# Patient Record
Sex: MALE | Race: OTHER | Employment: FULL TIME | URBAN - METROPOLITAN AREA
[De-identification: names, ages, dates, MRNs, and addresses within clinical notes are randomized per-mention and may not be internally consistent; named-entity substitution may affect disease eponyms.]

---

## 2017-04-05 ENCOUNTER — OFFICE VISIT (OUTPATIENT)
Dept: FAMILY MEDICINE CLINIC | Facility: CLINIC | Age: 42
End: 2017-04-05

## 2017-04-05 VITALS
HEART RATE: 66 BPM | RESPIRATION RATE: 20 BRPM | SYSTOLIC BLOOD PRESSURE: 124 MMHG | WEIGHT: 282 LBS | BODY MASS INDEX: 39.48 KG/M2 | HEIGHT: 71 IN | OXYGEN SATURATION: 96 % | DIASTOLIC BLOOD PRESSURE: 80 MMHG

## 2017-04-05 DIAGNOSIS — E66.09 NON MORBID OBESITY DUE TO EXCESS CALORIES: ICD-10-CM

## 2017-04-05 DIAGNOSIS — E78.00 HYPERCHOLESTEREMIA: ICD-10-CM

## 2017-04-05 DIAGNOSIS — Z00.01 ENCOUNTER FOR ROUTINE ADULT HEALTH EXAMINATION WITH ABNORMAL FINDINGS: Primary | ICD-10-CM

## 2017-04-05 PROCEDURE — 99396 PREV VISIT EST AGE 40-64: CPT

## 2017-04-05 NOTE — PROGRESS NOTES
CC: Annual Physical Exam     HPI:   Radha Paez is a 35year old male who presents for a complete physical exam. Pt denies any complains at this time.     Wt Readings from Last 6 Encounters:  04/05/17 : 282 lb  08/17/16 : 273 lb    Body mass index is mg/dL   TOTAL PROTEIN (P) 7.4 5.9 - 8.4 g/dL   ALBUMIN (P) 4.1 3.5 - 4.8 g/dL   GLOBULIN 3.3 2.5 - 3.7 g/dL   A/G RATIO 1.2 1.0 - 2.0   GFR/NON- >60 >60   GFR/ >60 >60   -LIPID PANEL   Result Value Ref Range   HDL CHOLESTERO bladder control. HEMATOLOGIC:  Denies anemia, bleeding or bruising. LYMPHATICS:  Denies enlarged nodes or history of splenectomy. PSYCHIATRIC:  Denies depression or anxiety.   ENDOCRINOLOGIC:  Denies excessive sweating, cold or heat intolerance, polyuria Placed This Encounter  CBC, Platelet, No Differential [E]  Comp Metabolic Panel (14) [E]  Lipid Panel [E]  Urinalysis, Routine [E][If pt <2 yrs old, must also order Reducing Substance ([id number])]    1.  Encounter for routine adult health examination with abn

## 2017-04-05 NOTE — PATIENT INSTRUCTIONS
Prevention Guidelines, Men Ages 36 to 52  Screening tests and vaccines are an important part of managing your health. Health counseling is essential, too. Below are guidelines for these, for men ages 36 to 52.  Talk with your healthcare provider to make s Hepatitis A Men at increased risk for infection – talk with your healthcare provider 2 doses given at least 6 months apart   Hepatitis B Men at increased risk for infection – talk with your healthcare provider 3 doses over 6 months; second dose should be g © 6476-4023 [address], [address] [place]. All rights reserved. This information is not intended as a substitute for professional medical care. Always follow your healthcare professional's instructions.

## 2017-08-31 ENCOUNTER — TELEPHONE (OUTPATIENT)
Dept: FAMILY MEDICINE CLINIC | Facility: CLINIC | Age: 42
End: 2017-08-31

## 2017-08-31 NOTE — TELEPHONE ENCOUNTER
Dr Sam Booker is unable to Rx Viagra for patient, last time he got it was from Dr Elvis Luu back in 2015. Patient was notified and will schedule an appointment to get a new Rx.

## 2017-11-05 ENCOUNTER — HOSPITAL ENCOUNTER (EMERGENCY)
Facility: HOSPITAL | Age: 42
Discharge: HOME | End: 2017-11-05
Payer: COMMERCIAL

## 2017-11-05 VITALS
HEART RATE: 61 BPM | SYSTOLIC BLOOD PRESSURE: 125 MMHG | BODY MASS INDEX: 40 KG/M2 | TEMPERATURE: 98 F | WEIGHT: 285 LBS | OXYGEN SATURATION: 100 % | RESPIRATION RATE: 18 BRPM | DIASTOLIC BLOOD PRESSURE: 71 MMHG

## 2017-11-05 DIAGNOSIS — S61.012A THUMB LACERATION, LEFT, INITIAL ENCOUNTER: Primary | ICD-10-CM

## 2017-11-05 PROCEDURE — 90471 IMMUNIZATION ADMIN: CPT

## 2017-11-05 PROCEDURE — 12001 RPR S/N/AX/GEN/TRNK 2.5CM/<: CPT

## 2017-11-05 PROCEDURE — 99283 EMERGENCY DEPT VISIT LOW MDM: CPT

## 2017-11-05 NOTE — ED PROVIDER NOTES
Patient Seen in: [facility] AND [facility] Emergency Department    History   Patient presents with:  Laceration Abrasion (integumentary)    Stated Complaint: laceration from knife to left thumb    HPI    42-year-old male presents to the emergency department with Eyes: Pupils are equal, round, and reactive to light. Musculoskeletal: Normal range of motion. He exhibits tenderness (thenar eminence tender to touch). He exhibits no edema or deformity.    Neurological: He is alert and oriented to person, place, and t

## 2017-11-05 NOTE — ED NOTES
Gauze dressing to site, discharge instructions given and reviewed, told to follow up with pmd 1-2 days for wound check. Return with any new or worsening symptoms. Pt verbalized knowledge home with family.

## 2017-11-11 ENCOUNTER — HOSPITAL ENCOUNTER (EMERGENCY)
Facility: HOSPITAL | Age: 42
Discharge: HOME | End: 2017-11-11
Attending: EMERGENCY MEDICINE
Payer: COMMERCIAL

## 2017-11-11 ENCOUNTER — APPOINTMENT (OUTPATIENT)
Dept: GENERAL RADIOLOGY | Facility: HOSPITAL | Age: 42
End: 2017-11-11
Attending: EMERGENCY MEDICINE
Payer: COMMERCIAL

## 2017-11-11 VITALS
HEART RATE: 96 BPM | SYSTOLIC BLOOD PRESSURE: 140 MMHG | DIASTOLIC BLOOD PRESSURE: 82 MMHG | RESPIRATION RATE: 19 BRPM | TEMPERATURE: 98 F | OXYGEN SATURATION: 97 %

## 2017-11-11 DIAGNOSIS — I10 UNCONTROLLED HYPERTENSION: Primary | ICD-10-CM

## 2017-11-11 DIAGNOSIS — R51.9 ACUTE NONINTRACTABLE HEADACHE, UNSPECIFIED HEADACHE TYPE: ICD-10-CM

## 2017-11-11 PROCEDURE — 80048 BASIC METABOLIC PNL TOTAL CA: CPT | Performed by: EMERGENCY MEDICINE

## 2017-11-11 PROCEDURE — 96361 HYDRATE IV INFUSION ADD-ON: CPT

## 2017-11-11 PROCEDURE — 96375 TX/PRO/DX INJ NEW DRUG ADDON: CPT

## 2017-11-11 PROCEDURE — 93005 ELECTROCARDIOGRAM TRACING: CPT

## 2017-11-11 PROCEDURE — 71020 XR CHEST PA + LAT CHEST (CPT=71020): CPT | Performed by: EMERGENCY MEDICINE

## 2017-11-11 PROCEDURE — 85025 COMPLETE CBC W/AUTO DIFF WBC: CPT | Performed by: EMERGENCY MEDICINE

## 2017-11-11 PROCEDURE — 84484 ASSAY OF TROPONIN QUANT: CPT | Performed by: EMERGENCY MEDICINE

## 2017-11-11 PROCEDURE — 93010 ELECTROCARDIOGRAM REPORT: CPT | Performed by: EMERGENCY MEDICINE

## 2017-11-11 PROCEDURE — 99285 EMERGENCY DEPT VISIT HI MDM: CPT

## 2017-11-11 PROCEDURE — 96374 THER/PROPH/DIAG INJ IV PUSH: CPT

## 2017-11-11 RX ORDER — KETOROLAC TROMETHAMINE 30 MG/ML
INJECTION, SOLUTION INTRAMUSCULAR; INTRAVENOUS
Status: COMPLETED
Start: 2017-11-11 | End: 2017-11-11

## 2017-11-11 RX ORDER — KETOROLAC TROMETHAMINE 30 MG/ML
30 INJECTION, SOLUTION INTRAMUSCULAR; INTRAVENOUS ONCE
Status: COMPLETED | OUTPATIENT
Start: 2017-11-11 | End: 2017-11-11

## 2017-11-11 RX ORDER — SODIUM CHLORIDE 9 MG/ML
INJECTION, SOLUTION INTRAVENOUS ONCE
Status: COMPLETED | OUTPATIENT
Start: 2017-11-11 | End: 2017-11-11

## 2017-11-11 RX ORDER — AMLODIPINE BESYLATE 5 MG/1
5 TABLET ORAL DAILY
Qty: 30 TABLET | Refills: 0 | Status: SHIPPED | OUTPATIENT
Start: 2017-11-11 | End: 2017-11-16

## 2017-11-11 RX ORDER — METOCLOPRAMIDE HYDROCHLORIDE 5 MG/ML
10 INJECTION INTRAMUSCULAR; INTRAVENOUS ONCE
Status: COMPLETED | OUTPATIENT
Start: 2017-11-11 | End: 2017-11-11

## 2017-11-12 NOTE — ED PROVIDER NOTES
Patient Seen in: [facility] AND [facility] Emergency Department    History   Patient presents with:  Arrythmia/Palpitations (cardiovascular)    Stated Complaint:     HPI    40-year-old male patient presents complaining of a headache since last night associated w distress  HEENT: Anicteric, EOMI, PERRL, clear oropharynx  Neck:. Supple. No meningismus.   Heart: Regular rate and rhythm, no murmur  Lungs: Normal respiratory effort, clear lungs  Abdomen: Soft,  nondistended, non tender  : No CVA tenderness  Skin: No type    Disposition:  Discharge  11/11/2017  7:23 pm    Follow-up:  Jim Agrawal MD  [address]  [phone]    Schedule an appointment as soon as possible for a visit in 2 days  For reevaluation of your symptoms.

## 2017-11-16 ENCOUNTER — OFFICE VISIT (OUTPATIENT)
Dept: FAMILY MEDICINE CLINIC | Facility: CLINIC | Age: 42
End: 2017-11-16

## 2017-11-16 VITALS — SYSTOLIC BLOOD PRESSURE: 144 MMHG | BODY MASS INDEX: 39 KG/M2 | WEIGHT: 281 LBS | DIASTOLIC BLOOD PRESSURE: 96 MMHG

## 2017-11-16 DIAGNOSIS — E66.09 NON MORBID OBESITY DUE TO EXCESS CALORIES: Primary | ICD-10-CM

## 2017-11-16 DIAGNOSIS — I10 HYPERTENSION, UNSPECIFIED TYPE: ICD-10-CM

## 2017-11-16 DIAGNOSIS — Z00.00 WELL ADULT EXAM: ICD-10-CM

## 2017-11-16 PROCEDURE — 99213 OFFICE O/P EST LOW 20 MIN: CPT | Performed by: FAMILY MEDICINE

## 2017-11-16 PROCEDURE — 99396 PREV VISIT EST AGE 40-64: CPT | Performed by: FAMILY MEDICINE

## 2017-11-16 RX ORDER — AMLODIPINE BESYLATE AND BENAZEPRIL HYDROCHLORIDE 5; 20 MG/1; MG/1
1 CAPSULE ORAL DAILY
Qty: 90 CAPSULE | Refills: 3 | Status: SHIPPED | OUTPATIENT
Start: 2017-11-16 | End: 2019-11-21

## 2017-11-16 NOTE — PROGRESS NOTES
[facility] Office Note  Chief Complaint:   Patient presents with:  HTN      HPI:   This is a 38year old male coming in for f\u htn      Results for orders placed or performed during the hospital encounter of 11/11/17 Erectile dysfunction    • Hypertension    • Obesity      Past Surgical History:  No date: CIRCUMCISION,OTHR,  10/31/15: LAPAROSCOPIC APPENDECTOMY  Social History:  Smoking status: Former Smoker 39.19 kg/m² as calculated from the following:    Height as of 4/5/17: 71\". Weight as of this encounter: 281 lb. Vital signs reviewed. Appears stated age, well groomed.   Physical Exam:  GEN:  Patient is alert, awake and oriented, well developed, well n the treatments as a result of today.      Problem List:  Patient Active Problem List:     Acute medial meniscal tear of the right knee     Encounter for routine adult health examination with abnormal findings     Hypercholesteremia     Non morbid obesity du

## 2017-12-04 ENCOUNTER — NURSE ONLY (OUTPATIENT)
Dept: FAMILY MEDICINE CLINIC | Facility: CLINIC | Age: 42
End: 2017-12-04

## 2017-12-04 DIAGNOSIS — E78.00 HYPERCHOLESTEREMIA: ICD-10-CM

## 2017-12-04 DIAGNOSIS — Z00.01 ENCOUNTER FOR ROUTINE ADULT HEALTH EXAMINATION WITH ABNORMAL FINDINGS: Primary | ICD-10-CM

## 2017-12-04 PROCEDURE — 36415 COLL VENOUS BLD VENIPUNCTURE: CPT | Performed by: FAMILY MEDICINE

## 2017-12-04 NOTE — PROCEDURES
Patient presented to clinic for routine blood work. Orders verified in patient chart. Name and  verified. Patient is fasting. Blood drawn from the left antecubital, tolerated well without complications.   Sent to [employer].

## 2017-12-11 ENCOUNTER — MED REC SCAN ONLY (OUTPATIENT)
Dept: FAMILY MEDICINE CLINIC | Facility: CLINIC | Age: 42
End: 2017-12-11

## 2017-12-11 ENCOUNTER — OFFICE VISIT (OUTPATIENT)
Dept: FAMILY MEDICINE CLINIC | Facility: CLINIC | Age: 42
End: 2017-12-11

## 2017-12-11 VITALS — SYSTOLIC BLOOD PRESSURE: 140 MMHG | DIASTOLIC BLOOD PRESSURE: 82 MMHG | WEIGHT: 281 LBS | BODY MASS INDEX: 39 KG/M2

## 2017-12-11 DIAGNOSIS — I10 HYPERTENSION, UNSPECIFIED TYPE: Primary | ICD-10-CM

## 2017-12-11 DIAGNOSIS — E78.2 MIXED HYPERLIPIDEMIA: ICD-10-CM

## 2017-12-11 PROCEDURE — 99213 OFFICE O/P EST LOW 20 MIN: CPT | Performed by: FAMILY MEDICINE

## 2017-12-11 NOTE — PATIENT INSTRUCTIONS
FOODS TO AVOID    Meats/Fish:   Avoid marbled beef, duck and goose (remove the skin from poultry); processed meats; luncheon meats (salami, bologna); frankfurters and fast food hamburgers (they're loaded with fat); organ meats; (kidney, liver); and canned serving must be substituted for one bread or cereal serving. Limit alcohol to 2 servings per day. Special Notes:  1. Use all foods in moderation. 2.  Read labels carefully-sometimes they can be misleading.   3.  While on a triglyceride lowering di and goldie toast may also be used as bread substitutes, as may pasta, rice or corn. Beans:   Dried peas or beans may be used as bread substitute.     Nuts:  Nuts are high in fat and calories, although most of the fat is unsaturated and there is no cholest

## 2017-12-11 NOTE — PROGRESS NOTES
[facility] Office Note  Chief Complaint:   Patient presents with:  Lab Results  HTN      HPI:   This is a 38year old male coming in for f\u.       Results for orders placed or performed in visit on 12/07/17  -MARIBEL LAI lesions or ulcerations, good dentition. NECK: Supple, no CLAD, no JVD, no thyromegaly. SKIN: No rashes, no skin lesion, no bruising, good turgor. HEART:  Regular rate and rhythm, no murmurs, rubs or gallops.   LUNGS: Clear to auscultation bilterally, no

## 2023-06-28 ENCOUNTER — OFFICE VISIT (OUTPATIENT)
Dept: INTERNAL MEDICINE CLINIC | Facility: CLINIC | Age: 48
End: 2023-06-28
Payer: COMMERCIAL

## 2023-06-28 VITALS
HEIGHT: 70 IN | TEMPERATURE: 99 F | DIASTOLIC BLOOD PRESSURE: 70 MMHG | HEART RATE: 65 BPM | WEIGHT: 281 LBS | SYSTOLIC BLOOD PRESSURE: 118 MMHG | BODY MASS INDEX: 40.23 KG/M2 | OXYGEN SATURATION: 98 %

## 2023-06-28 DIAGNOSIS — L91.0 KELOID: ICD-10-CM

## 2023-06-28 DIAGNOSIS — Z76.89 ESTABLISHING CARE WITH NEW DOCTOR, ENCOUNTER FOR: Primary | ICD-10-CM

## 2023-06-28 DIAGNOSIS — Z12.11 ENCOUNTER FOR SCREENING COLONOSCOPY: ICD-10-CM

## 2023-06-28 DIAGNOSIS — R73.03 PREDIABETES: ICD-10-CM

## 2023-06-28 DIAGNOSIS — E78.5 HYPERLIPIDEMIA, UNSPECIFIED HYPERLIPIDEMIA TYPE: ICD-10-CM

## 2023-06-28 PROCEDURE — 99203 OFFICE O/P NEW LOW 30 MIN: CPT | Performed by: INTERNAL MEDICINE

## 2023-06-28 PROCEDURE — 3074F SYST BP LT 130 MM HG: CPT | Performed by: INTERNAL MEDICINE

## 2023-06-28 PROCEDURE — 3008F BODY MASS INDEX DOCD: CPT | Performed by: INTERNAL MEDICINE

## 2023-06-28 PROCEDURE — 3078F DIAST BP <80 MM HG: CPT | Performed by: INTERNAL MEDICINE

## 2023-08-01 ENCOUNTER — NURSE ONLY (OUTPATIENT)
Facility: CLINIC | Age: 48
End: 2023-08-01

## 2023-08-01 DIAGNOSIS — Z12.11 COLON CANCER SCREENING: Primary | ICD-10-CM

## 2023-08-01 DIAGNOSIS — Z80.0 FH: GI CANCER: ICD-10-CM

## 2023-08-01 NOTE — PROGRESS NOTES
Dx: Family hx of colon cancer  Colonoscopy with MAC sedation  Split dose suprep, if suprep not covered then golytely or moviprep, sent to pharmacy  Please review prep instructions with patient    - If the patient is taking oral diabetic medications then they should HOLD diabetic medications the night before and/or the day of the procedure - If the patient is on insulin, please have the PCP or endocrinologist assist with management of dosing prior to the procedure.  - NO herbal supplements or weight loss medications x 7 days prior to the procedure. - If patient is taking Xarelto OR Eliquis then it needs to be helf for 48 hours prior to the procedure --> Should get approval from the prescribing physician (PCP or cardiologist) to hold this medication prior to the procedure. - If the patient is taking Coumadin then it needs to be on hold Coumadin for 5 days prior to the procedure --> Should get approval from the prescribing physician (PCP or cardiologist) to hold this medication prior to the procedure. *If the bowel prep prescribed is too expensive/not covered by the patient's insurance please pend an alternative and I will sign that order. Thank you.

## 2023-08-01 NOTE — PROGRESS NOTES
Scheduled for: Colonoscopy [id number]  Provider Name: Dr Pete Murillo   Date: Efren Carty 11/16/2023  Location: [facility]    Sedation: MAC   Time: 8:15 am   Prep: split golytely  Meds/Allergies Reconciled?: reviewed by provider   Diagnosis with codes: colon screening Z12.11, family Hx colon cancer Z80.0  Was patient informed to call insurance with codes (Y/N): Yes   Referral sent?: Yes  [facility] or [facility] notified?: Electronic case request was sent to [facility] via [employer]. Medication Orders: Pt is aware to NOT take iron pills, herbal meds and diet supplements for 7 days before exam. Also to NOT take any form of alcohol, recreational drugs and any forms of ED meds 24 hours before exam.      Misc Orders:       Further instructions given by staff:  Instructions given in office and pt verbalized understanding    [facility] instructions sent

## 2023-11-16 PROBLEM — Z12.11 COLON CANCER SCREENING: Status: ACTIVE | Noted: 2017-04-05

## 2023-11-21 ENCOUNTER — TELEPHONE (OUTPATIENT)
Facility: CLINIC | Age: 48
End: 2023-11-21

## 2023-11-21 NOTE — TELEPHONE ENCOUNTER
Recall colonoscopy in 1 year per Dr Gail Saavedra.     Colon done 11/16/2023    Health maintenance updated and message sent to patient outreach to repeat colonoscopy in 1 year

## 2023-11-21 NOTE — TELEPHONE ENCOUNTER
----- Message from Rene Mckeon MD sent at 11/20/2023  4:05 PM CST -----  Large, tubulovillous adenoma removed. This plus 7 other adenomas. Given size of TVA I would like him to repeat colonoscopy in 1 year. I discussed this with the patient who expressed understanding. GI Staff:    Can you please place recall for this patient to have a colonoscopy in 1 year. Thank you.     Rene Mckeon MD

## 2023-12-04 ENCOUNTER — OFFICE VISIT (OUTPATIENT)
Dept: INTERNAL MEDICINE CLINIC | Facility: CLINIC | Age: 48
End: 2023-12-04
Payer: COMMERCIAL

## 2023-12-04 ENCOUNTER — LAB ENCOUNTER (OUTPATIENT)
Dept: LAB | Facility: HOSPITAL | Age: 48
End: 2023-12-04
Attending: INTERNAL MEDICINE
Payer: COMMERCIAL

## 2023-12-04 ENCOUNTER — TELEPHONE (OUTPATIENT)
Dept: INTERNAL MEDICINE CLINIC | Facility: CLINIC | Age: 48
End: 2023-12-04

## 2023-12-04 VITALS
HEART RATE: 71 BPM | OXYGEN SATURATION: 96 % | TEMPERATURE: 98 F | DIASTOLIC BLOOD PRESSURE: 72 MMHG | WEIGHT: 283 LBS | SYSTOLIC BLOOD PRESSURE: 124 MMHG | BODY MASS INDEX: 39 KG/M2

## 2023-12-04 DIAGNOSIS — Z12.5 ENCOUNTER FOR SCREENING FOR MALIGNANT NEOPLASM OF PROSTATE: ICD-10-CM

## 2023-12-04 DIAGNOSIS — Z00.00 ANNUAL PHYSICAL EXAM: Primary | ICD-10-CM

## 2023-12-04 DIAGNOSIS — R63.4 WEIGHT LOSS: ICD-10-CM

## 2023-12-04 DIAGNOSIS — R73.09 ELEVATED GLUCOSE: ICD-10-CM

## 2023-12-04 LAB
ALBUMIN SERPL-MCNC: 4.6 G/DL (ref 3.2–4.8)
ALBUMIN/GLOB SERPL: 1.5 {RATIO} (ref 1–2)
ALP LIVER SERPL-CCNC: 88 U/L
ALT SERPL-CCNC: 21 U/L
ANION GAP SERPL CALC-SCNC: 7 MMOL/L (ref 0–18)
AST SERPL-CCNC: 17 U/L (ref ?–34)
BASOPHILS # BLD AUTO: 0.05 X10(3) UL (ref 0–0.2)
BASOPHILS NFR BLD AUTO: 0.8 %
BILIRUB SERPL-MCNC: 1 MG/DL (ref 0.3–1.2)
BUN BLD-MCNC: 12 MG/DL (ref 9–23)
BUN/CREAT SERPL: 11.3 (ref 10–20)
CALCIUM BLD-MCNC: 9.5 MG/DL (ref 8.7–10.4)
CHLORIDE SERPL-SCNC: 107 MMOL/L (ref 98–112)
CHOLEST SERPL-MCNC: 200 MG/DL (ref ?–200)
CO2 SERPL-SCNC: 26 MMOL/L (ref 21–32)
COMPLEXED PSA SERPL-MCNC: 1.55 NG/ML (ref ?–4)
CREAT BLD-MCNC: 1.06 MG/DL
DEPRECATED RDW RBC AUTO: 41.1 FL (ref 35.1–46.3)
EGFRCR SERPLBLD CKD-EPI 2021: 85 ML/MIN/1.73M2 (ref 60–?)
EOSINOPHIL # BLD AUTO: 0.16 X10(3) UL (ref 0–0.7)
EOSINOPHIL NFR BLD AUTO: 2.7 %
ERYTHROCYTE [DISTWIDTH] IN BLOOD BY AUTOMATED COUNT: 13.3 % (ref 11–15)
EST. AVERAGE GLUCOSE BLD GHB EST-MCNC: 108 MG/DL (ref 68–126)
FASTING PATIENT LIPID ANSWER: YES
FASTING STATUS PATIENT QL REPORTED: YES
GLOBULIN PLAS-MCNC: 3.1 G/DL (ref 2.8–4.4)
GLUCOSE BLD-MCNC: 100 MG/DL (ref 70–99)
HBA1C MFR BLD: 5.4 % (ref ?–5.7)
HCT VFR BLD AUTO: 44 %
HDLC SERPL-MCNC: 37 MG/DL (ref 40–59)
HGB BLD-MCNC: 14.4 G/DL
IMM GRANULOCYTES # BLD AUTO: 0.05 X10(3) UL (ref 0–1)
IMM GRANULOCYTES NFR BLD: 0.8 %
LDLC SERPL CALC-MCNC: 137 MG/DL (ref ?–100)
LYMPHOCYTES # BLD AUTO: 1.11 X10(3) UL (ref 1–4)
LYMPHOCYTES NFR BLD AUTO: 18.7 %
MCH RBC QN AUTO: 28 PG (ref 26–34)
MCHC RBC AUTO-ENTMCNC: 32.7 G/DL (ref 31–37)
MCV RBC AUTO: 85.6 FL
MONOCYTES # BLD AUTO: 0.51 X10(3) UL (ref 0.1–1)
MONOCYTES NFR BLD AUTO: 8.6 %
NEUTROPHILS # BLD AUTO: 4.07 X10 (3) UL (ref 1.5–7.7)
NEUTROPHILS # BLD AUTO: 4.07 X10(3) UL (ref 1.5–7.7)
NEUTROPHILS NFR BLD AUTO: 68.4 %
NONHDLC SERPL-MCNC: 163 MG/DL (ref ?–130)
OSMOLALITY SERPL CALC.SUM OF ELEC: 290 MOSM/KG (ref 275–295)
PLATELET # BLD AUTO: 249 10(3)UL (ref 150–450)
POTASSIUM SERPL-SCNC: 4.1 MMOL/L (ref 3.5–5.1)
PROT SERPL-MCNC: 7.7 G/DL (ref 5.7–8.2)
RBC # BLD AUTO: 5.14 X10(6)UL
SODIUM SERPL-SCNC: 140 MMOL/L (ref 136–145)
T4 FREE SERPL-MCNC: 1.5 NG/DL (ref 0.8–1.7)
TRIGL SERPL-MCNC: 146 MG/DL (ref 30–149)
TSI SER-ACNC: 1.36 MIU/ML (ref 0.55–4.78)
VLDLC SERPL CALC-MCNC: 27 MG/DL (ref 0–30)
WBC # BLD AUTO: 6 X10(3) UL (ref 4–11)

## 2023-12-04 PROCEDURE — 3078F DIAST BP <80 MM HG: CPT | Performed by: INTERNAL MEDICINE

## 2023-12-04 PROCEDURE — 84439 ASSAY OF FREE THYROXINE: CPT | Performed by: INTERNAL MEDICINE

## 2023-12-04 PROCEDURE — 3074F SYST BP LT 130 MM HG: CPT | Performed by: INTERNAL MEDICINE

## 2023-12-04 PROCEDURE — 80050 GENERAL HEALTH PANEL: CPT | Performed by: INTERNAL MEDICINE

## 2023-12-04 PROCEDURE — 84153 ASSAY OF PSA TOTAL: CPT | Performed by: INTERNAL MEDICINE

## 2023-12-04 PROCEDURE — 99396 PREV VISIT EST AGE 40-64: CPT | Performed by: INTERNAL MEDICINE

## 2023-12-04 PROCEDURE — 80061 LIPID PANEL: CPT | Performed by: INTERNAL MEDICINE

## 2023-12-04 PROCEDURE — 83036 HEMOGLOBIN GLYCOSYLATED A1C: CPT | Performed by: INTERNAL MEDICINE

## 2023-12-04 NOTE — TELEPHONE ENCOUNTER
Pt called stating that he just went to get bw done and nurse was unable to get a vein so they jaqueline blood out of his left hand and now he has a huge bubble there, pt wants to know if this is normal?  Please advise

## 2023-12-05 NOTE — TELEPHONE ENCOUNTER
I spoke with pt, said blood draw was from hand yesterday. Started to bubble while phlebotomist was drawing it. Told it would bruise. Said a couple hours after, he removed bandage and the area bubbled. Spouse knows a nurse who advised to massage the area. Pt reports bubble has resolved. Hand is a bit sore, denies pain. No further questions at this time.

## 2023-12-13 ENCOUNTER — MED REC SCAN ONLY (OUTPATIENT)
Facility: CLINIC | Age: 48
End: 2023-12-13

## 2024-09-25 ENCOUNTER — TELEPHONE (OUTPATIENT)
Facility: CLINIC | Age: 49
End: 2024-09-25

## 2024-09-25 DIAGNOSIS — D12.6 TUBULOVILLOUS ADENOMA OF COLON: Primary | ICD-10-CM

## 2024-09-25 NOTE — TELEPHONE ENCOUNTER
Left message for the patient to call back     Last Procedure, Date, MD:  2023, colonoscopy, Dr Ricketts  Last Diagnosis:  sessile serrated adenoma  Recalled (mth/yrs): 1 year  Sedation Used Previously:  MAC  Last Prep Used (if known):  Golytely  Quality Of Prep (if known): good  Anticoagulants:  Diuretics:   Diabetic Med's (PO/Injectables):   Weight loss Med's:  Iron/Herbal/Multivitamin Supplements (RX/OTC):  Marijuana/Vaping/CBD: no  Height & Weight: 5'11\"/283  BMI:39.47  Hx of Cardiac/CVA Issues/(MI/Stroke):  Devices Pacemaker/Defibrillator/Stents:  Respiratory Issues/Oxygen Use/HALEY/COPD:  Issues w/ Anesthesia:    Symptoms (Y/N):   Symptoms Details:     Special Comments/Notes: LOV note with Dr Sosa 2023    Please advise on orders and prep.     Thank you!        Jeevan Ricketts MD   Physician  Gastroenterology     Operative Report     Signed     Date of Service: 2023  8:16 AM  Case Time: Procedures: Surgeons:   2023  8:16 AM COLONOSCOPY    Jeevan Ricketts MD               Signed         COLONOSCOPY REPORT           Drew Carlin      10/23/1975 Age 48 year old   PCP Shakeel Sosa MD Endoscopist Jeevan Ricketts MD         Date of procedure: 23     [facility]     Procedure: Colonoscopy w/ submucosal injection, hot snare polypectomy with clip placement, cold snare polypectomy, cold forceps polypectomy     Pre-operative diagnosis: screening     Post-operative diagnosis: polyps     Medications: MAC     Withdrawal time: 31 minutes     Procedure:  Informed consent was obtained from the patient after the risks of the procedure were discussed, including but not limited to bleeding, perforation, aspiration, infection, or possibility of a missed lesion. After discussions of the risks/benefits and alternatives to this procedure, as well as the planned sedation, the patient was placed in the left lateral decubitus position  and begun on continuous blood pressure pulse oximetry and EKG monitoring and this was maintained throughout the procedure. Once an adequate level of sedation was obtained a digital rectal exam was completed. Then the lubricated tip of the [id number] diagnostic video colonoscope was inserted and advanced without difficulty to the cecum using water immersion and CO2 insufflation technique. The cecum was identified by localizing the trifold, the appendix and the ileocecal valve. Withdrawal was begun with thorough washing and careful examination of the colonic walls and folds. A routine second examination of the cecum/ascending colon was performed. Photodocumentation was obtained. The bowel prep was good. Views of the colon were good with washing. I then carefully withdrew the instrument from the patient who tolerated the procedure well.      Complications: none.     Findings:   1. 8 polyps noted as follows:      A. THREE -- 5 mm polyps in the ascending colon; sessile morphology; cold snare polypectomy performed, polyps retrieved.      B. TWO -- 2 mm polyps in the ascending colon; sessile morphology; cold forceps polypectomy performed, polyps retrieved.      C. TWO -- 2 mm polyps in the transverse colon; sessile morphology; cold forceps polypectomy performed, polyps retrieved.      D. 20 mm polyp in the descending colon; semi-pedunculated morphology; 5 ccs of \"blue boost\" was injected into the base of the stalk prior to hot snare polypectomy with success. This raised the polyp up and made it easier to get the snare around the entire polyp as it was multilobulated and covering a portion of the stalk. Hot snare polypectomy was performed, the polyp fragmented after resection. The polyp fragments were retrieved. There was no oozing after resection. Three hemoclips were placed to prevent future bleeding.     2. Diverticulosis: none visualized..     3. Ileocecal valve: the visualized mucosa appeared normal.     4. The  colonic mucosa throughout the colon showed normal vascular pattern, without evidence of angioectasias or inflammation.      5. A retroflexed view of the rectum revealed small internal hemorrhoids.     6. KEN: normal rectal tone, no masses palpated.      Impression:   8 polyps resected ranging in size from 2 mm to 20 mm. The 20 mm polyp was removed via hot snare polypectomy with 3 hemoclips placed after resection.  The colon was otherwise normal with glistening mucosa and intact vascular pattern throughout.     Recommend:  Await pathology. The interval for the next colonoscopy will be determined after reviewing pathology. If new signs or symptoms develop, colonoscopy may need to be repeated sooner.   High fiber diet.  Monitor for blood in the stool. If having more than just tinge of blood, call office or go to the ER.  Repeat colonoscopy in 1 to 3 years pending pathology.     >>>If tissue was obtained and you have not received your pathology results either by phone or letter within 2 weeks, please call our office at [phone].     Specimens: polyps     Blood loss: <1 ml               Electronically signed by Jeevan Ricketts MD at 11/16/2023  9:01 AM  ==========================  A DIAGNOSIS    Comment: Fragments of tubular adenoma.     B DIAGNOSIS    Comment: Fragments of sessile serrated lesion/polyp  (SSL/P); negative for cytologic dysplasia.     C DIAGNOSIS    Comment: Fragments of tubulovillous adenoma.

## 2024-09-26 NOTE — TELEPHONE ENCOUNTER
Called and spoke to patient. Dr. Ricketts, patient is wondering if his prep could be changed to the pills instead of the Go-lytley.     Last Procedure, Date, MD:  2023, colonoscopy, Dr Ricketts  Last Diagnosis:  sessile serrated adenoma  Recalled (mth/yrs): 1 year  Sedation Used Previously:  MAC  Last Prep Used (if known):  Golytely  Quality Of Prep (if known): good  Anticoagulants: No  Diuretics: No  Diabetic Med's (PO/Injectables): No  Weight loss Med's: No  Iron/Herbal/Multivitamin Supplements (RX/OTC): No  Marijuana/Vaping/CBD: No  Height & Weight: 5'11\"/279.4  BMI:38.9  Hx of Cardiac/CVA Issues/(MI/Stroke): No  Devices Pacemaker/Defibrillator/Stents: No  Respiratory Issues/Oxygen Use/HALEY/COPD: No  Issues w/ Anesthesia: No     Symptoms (Y/N): Patient experiences abdominal bloating after eating certain fast foods or processed foods. Patient tries to stay away  Symptoms Details: Periodic abdominal bloating after eating. Patient is working on changing his food intake and avoiding foods that cause his symptoms.     Special Comments/Notes: LOV note with Dr Sosa 2023     Please advise on orders and prep.      Thank you!          Jeevan Ricketts MD   Physician  Gastroenterology     Operative Report     Signed     Date of Service: 2023  8:16 AM        Case Time: Procedures: Surgeons:    2023  8:16 AM COLONOSCOPY    Jeevan Ricketts MD                     Signed          COLONOSCOPY REPORT                           Drew Tesfaye       10/23/1975 Age 48 year old      PCP Shakeel Sosa MD Endoscopist Jeevan Ricketts MD            Date of procedure: 23     [facility]     Procedure: Colonoscopy w/ submucosal injection, hot snare polypectomy with clip placement, cold snare polypectomy, cold forceps polypectomy     Pre-operative diagnosis: screening     Post-operative diagnosis: polyps     Medications: MAC     Withdrawal time:  31 minutes     Procedure:  Informed consent was obtained from the patient after the risks of the procedure were discussed, including but not limited to bleeding, perforation, aspiration, infection, or possibility of a missed lesion. After discussions of the risks/benefits and alternatives to this procedure, as well as the planned sedation, the patient was placed in the left lateral decubitus position and begun on continuous blood pressure pulse oximetry and EKG monitoring and this was maintained throughout the procedure. Once an adequate level of sedation was obtained a digital rectal exam was completed. Then the lubricated tip of the [id number] diagnostic video colonoscope was inserted and advanced without difficulty to the cecum using water immersion and CO2 insufflation technique. The cecum was identified by localizing the trifold, the appendix and the ileocecal valve. Withdrawal was begun with thorough washing and careful examination of the colonic walls and folds. A routine second examination of the cecum/ascending colon was performed. Photodocumentation was obtained. The bowel prep was good. Views of the colon were good with washing. I then carefully withdrew the instrument from the patient who tolerated the procedure well.      Complications: none.     Findings:   1. 8 polyps noted as follows:      A. THREE -- 5 mm polyps in the ascending colon; sessile morphology; cold snare polypectomy performed, polyps retrieved.      B. TWO -- 2 mm polyps in the ascending colon; sessile morphology; cold forceps polypectomy performed, polyps retrieved.      C. TWO -- 2 mm polyps in the transverse colon; sessile morphology; cold forceps polypectomy performed, polyps retrieved.      D. 20 mm polyp in the descending colon; semi-pedunculated morphology; 5 ccs of \"blue boost\" was injected into the base of the stalk prior to hot snare polypectomy with success. This raised the polyp up and made it easier to get the snare  around the entire polyp as it was multilobulated and covering a portion of the stalk. Hot snare polypectomy was performed, the polyp fragmented after resection. The polyp fragments were retrieved. There was no oozing after resection. Three hemoclips were placed to prevent future bleeding.     2. Diverticulosis: none visualized..     3. Ileocecal valve: the visualized mucosa appeared normal.     4. The colonic mucosa throughout the colon showed normal vascular pattern, without evidence of angioectasias or inflammation.      5. A retroflexed view of the rectum revealed small internal hemorrhoids.     6. KEN: normal rectal tone, no masses palpated.      Impression:   8 polyps resected ranging in size from 2 mm to 20 mm. The 20 mm polyp was removed via hot snare polypectomy with 3 hemoclips placed after resection.  The colon was otherwise normal with glistening mucosa and intact vascular pattern throughout.     Recommend:  Await pathology. The interval for the next colonoscopy will be determined after reviewing pathology. If new signs or symptoms develop, colonoscopy may need to be repeated sooner.   High fiber diet.  Monitor for blood in the stool. If having more than just tinge of blood, call office or go to the ER.  Repeat colonoscopy in 1 to 3 years pending pathology.     >>>If tissue was obtained and you have not received your pathology results either by phone or letter within 2 weeks, please call our office at [phone].     Specimens: polyps     Blood loss: <1 ml               Electronically signed by Jeevan Ricketts MD at 11/16/2023  9:01 AM  ==========================  A DIAGNOSIS     Comment: Fragments of tubular adenoma.      B DIAGNOSIS     Comment: Fragments of sessile serrated lesion/polyp  (SSL/P); negative for cytologic dysplasia.      C DIAGNOSIS     Comment: Fragments of tubulovillous adenoma.

## 2024-09-27 NOTE — TELEPHONE ENCOUNTER
Dx: hx of large TVA (sigmoid colon) 11/2023.  Colonoscopy with MAC sedation  Split dose SUTAB  Please review prep instructions with patient    - If the patient is taking oral diabetic medications then they should HOLD diabetic medications the night before and/or the day of the procedure   - If the patient is on insulin, please have the PCP or endocrinologist assist with management of dosing prior to the procedure.  - NO herbal supplements or weight loss medications x 7 days prior to the procedure.  - If patient is taking Xarelto OR Eliquis then it needs to be helf for 48 hours prior to the procedure --> Should get approval from the prescribing physician (PCP or cardiologist) to hold this medication prior to the procedure.  - If the patient is taking Coumadin then it needs to be on hold Coumadin for 5 days prior to the procedure --> Should get approval from the prescribing physician (PCP or cardiologist) to hold this medication prior to the procedure.  - If the patient takes Plavix/Brilinta then it needs to be held for 5 days prior to the procedure --> Should get approval from the prescribing physician (PCP or cardiologist) to hold this medication prior to the procedure.    *If the bowel prep prescribed is too expensive/not covered by the patient's insurance please pend an alternative and I will sign that order.    Thank you.

## 2024-09-30 ENCOUNTER — TELEPHONE (OUTPATIENT)
Facility: CLINIC | Age: 49
End: 2024-09-30

## 2024-09-30 RX ORDER — SOD SULF/POT CHLORIDE/MAG SULF 1.479 G
24 TABLET ORAL AS DIRECTED
Qty: 24 TABLET | Refills: 0 | Status: SHIPPED | OUTPATIENT
Start: 2024-09-30 | End: 2024-10-01

## 2024-09-30 NOTE — TELEPHONE ENCOUNTER
I spoke to the pt and told him to go to [employer] for the Savings card for the Sutab    Pt can complete the form on line and receive a savings card

## 2024-09-30 NOTE — TELEPHONE ENCOUNTER
Current Outpatient Medications   Medication Sig Dispense Refill    Sodium Sulfate-Mag Sulfate-KCl (SUTAB) 6584-865-612 MG Oral Tab Take 24 tablets by mouth As Directed for 1 day. Take 12 tablets the night before procedure with 16 oz of water, take 12 tablets the morning of your procedure with 16 oz of water 24 tablet 0     Not covered

## 2024-09-30 NOTE — TELEPHONE ENCOUNTER
Scheduled for:  Colonoscopy 83112  Provider Name:  Dr. Ricketts   Date:  12/19/2024  Location:    [facility]  Sedation:  Mac  Time:  2:15 (pt is aware that CF will call the day before to confirm arrival time)  Prep:  sutab  Meds/Allergies Reconciled?:  Physician reviewed   Diagnosis with codes:  hx of large TVA D12.6  Was patient informed to call insurance with codes (Y/N):  Yes, I confirmed [employer]  insurance with the patient.   Referral sent?:  Referral was sent at the time of electronic surgical scheduling.  EM or [facility] notified?:  I sent an electronic request to Endo Scheduling and received a confirmation today.   Medication Orders:  This patient verbally confirmed that he is not taking:   Iron, blood thinners, BP meds, and is not diabetic   Not latex allergy, Not PCN allergy and does not have a pacemaker  Misc Orders:     I discussed the prep instructions with the patient which he verbally understood and is aware that I will mychart the instructions today.    Further instructions given by staff:

## 2024-12-04 ENCOUNTER — OFFICE VISIT (OUTPATIENT)
Dept: INTERNAL MEDICINE CLINIC | Facility: CLINIC | Age: 49
End: 2024-12-04
Payer: COMMERCIAL

## 2024-12-04 ENCOUNTER — LAB ENCOUNTER (OUTPATIENT)
Dept: LAB | Facility: HOSPITAL | Age: 49
End: 2024-12-04
Attending: INTERNAL MEDICINE
Payer: COMMERCIAL

## 2024-12-04 VITALS
DIASTOLIC BLOOD PRESSURE: 80 MMHG | WEIGHT: 282 LBS | BODY MASS INDEX: 39.48 KG/M2 | HEART RATE: 98 BPM | HEIGHT: 71 IN | SYSTOLIC BLOOD PRESSURE: 118 MMHG | OXYGEN SATURATION: 98 %

## 2024-12-04 DIAGNOSIS — Z12.5 ENCOUNTER FOR SCREENING FOR MALIGNANT NEOPLASM OF PROSTATE: ICD-10-CM

## 2024-12-04 DIAGNOSIS — Z91.89 CARDIOVASCULAR RISK FACTOR: ICD-10-CM

## 2024-12-04 DIAGNOSIS — L91.0 KELOID: ICD-10-CM

## 2024-12-04 DIAGNOSIS — R73.09 ELEVATED GLUCOSE: ICD-10-CM

## 2024-12-04 DIAGNOSIS — Z00.00 ANNUAL PHYSICAL EXAM: Primary | ICD-10-CM

## 2024-12-04 DIAGNOSIS — Z28.21 INFLUENZA VACCINATION DECLINED: ICD-10-CM

## 2024-12-04 LAB
ALBUMIN SERPL-MCNC: 4.9 G/DL (ref 3.2–4.8)
ALBUMIN/GLOB SERPL: 1.7 {RATIO} (ref 1–2)
ALP LIVER SERPL-CCNC: 90 U/L
ALT SERPL-CCNC: 25 U/L
ANION GAP SERPL CALC-SCNC: 10 MMOL/L (ref 0–18)
AST SERPL-CCNC: 25 U/L (ref ?–34)
BASOPHILS # BLD AUTO: 0.06 X10(3) UL (ref 0–0.2)
BASOPHILS NFR BLD AUTO: 0.8 %
BILIRUB SERPL-MCNC: 1.2 MG/DL (ref 0.3–1.2)
BUN BLD-MCNC: 14 MG/DL (ref 9–23)
BUN/CREAT SERPL: 12.6 (ref 10–20)
CALCIUM BLD-MCNC: 9.7 MG/DL (ref 8.7–10.4)
CHLORIDE SERPL-SCNC: 106 MMOL/L (ref 98–112)
CHOLEST SERPL-MCNC: 202 MG/DL (ref ?–200)
CO2 SERPL-SCNC: 26 MMOL/L (ref 21–32)
COMPLEXED PSA SERPL-MCNC: 0.42 NG/ML (ref ?–4)
CREAT BLD-MCNC: 1.11 MG/DL
DEPRECATED RDW RBC AUTO: 41.5 FL (ref 35.1–46.3)
EGFRCR SERPLBLD CKD-EPI 2021: 80 ML/MIN/1.73M2 (ref 60–?)
EOSINOPHIL # BLD AUTO: 0.18 X10(3) UL (ref 0–0.7)
EOSINOPHIL NFR BLD AUTO: 2.5 %
ERYTHROCYTE [DISTWIDTH] IN BLOOD BY AUTOMATED COUNT: 13.4 % (ref 11–15)
EST. AVERAGE GLUCOSE BLD GHB EST-MCNC: 117 MG/DL (ref 68–126)
FASTING PATIENT LIPID ANSWER: YES
FASTING STATUS PATIENT QL REPORTED: YES
GLOBULIN PLAS-MCNC: 2.9 G/DL (ref 2–3.5)
GLUCOSE BLD-MCNC: 99 MG/DL (ref 70–99)
HBA1C MFR BLD: 5.7 % (ref ?–5.7)
HCT VFR BLD AUTO: 41.9 %
HDLC SERPL-MCNC: 36 MG/DL (ref 40–59)
HGB BLD-MCNC: 14 G/DL
IMM GRANULOCYTES # BLD AUTO: 0.05 X10(3) UL (ref 0–1)
IMM GRANULOCYTES NFR BLD: 0.7 %
LDLC SERPL CALC-MCNC: 138 MG/DL (ref ?–100)
LYMPHOCYTES # BLD AUTO: 1.03 X10(3) UL (ref 1–4)
LYMPHOCYTES NFR BLD AUTO: 14.4 %
MCH RBC QN AUTO: 28.4 PG (ref 26–34)
MCHC RBC AUTO-ENTMCNC: 33.4 G/DL (ref 31–37)
MCV RBC AUTO: 85 FL
MONOCYTES # BLD AUTO: 0.66 X10(3) UL (ref 0.1–1)
MONOCYTES NFR BLD AUTO: 9.2 %
NEUTROPHILS # BLD AUTO: 5.19 X10 (3) UL (ref 1.5–7.7)
NEUTROPHILS # BLD AUTO: 5.19 X10(3) UL (ref 1.5–7.7)
NEUTROPHILS NFR BLD AUTO: 72.4 %
NONHDLC SERPL-MCNC: 166 MG/DL (ref ?–130)
OSMOLALITY SERPL CALC.SUM OF ELEC: 295 MOSM/KG (ref 275–295)
PLATELET # BLD AUTO: 258 10(3)UL (ref 150–450)
POTASSIUM SERPL-SCNC: 3.9 MMOL/L (ref 3.5–5.1)
PROT SERPL-MCNC: 7.8 G/DL (ref 5.7–8.2)
RBC # BLD AUTO: 4.93 X10(6)UL
SODIUM SERPL-SCNC: 142 MMOL/L (ref 136–145)
TRIGL SERPL-MCNC: 154 MG/DL (ref 30–149)
VLDLC SERPL CALC-MCNC: 28 MG/DL (ref 0–30)
WBC # BLD AUTO: 7.2 X10(3) UL (ref 4–11)

## 2024-12-04 PROCEDURE — 83036 HEMOGLOBIN GLYCOSYLATED A1C: CPT | Performed by: INTERNAL MEDICINE

## 2024-12-04 PROCEDURE — 85025 COMPLETE CBC W/AUTO DIFF WBC: CPT | Performed by: INTERNAL MEDICINE

## 2024-12-04 PROCEDURE — G0103 PSA SCREENING: HCPCS | Performed by: INTERNAL MEDICINE

## 2024-12-04 PROCEDURE — 80053 COMPREHEN METABOLIC PANEL: CPT | Performed by: INTERNAL MEDICINE

## 2024-12-04 PROCEDURE — 80061 LIPID PANEL: CPT | Performed by: INTERNAL MEDICINE

## 2024-12-04 NOTE — PROGRESS NOTES
[facility] part of [facility]  Return Patient Progress Note      HPI:     Chief Complaint   Patient presents with    Physical     Annual physical - pt is fasting        Drew Carlin is a 49 year old male presenting for:    Has a significant  has a past medical history of Erectile dysfunction, Hypertension, and Obesity.    Here for annual.    Has C scope scheduled 12/19/2024.        Labs:   CMP:  Lab Results   Component Value Date/Time     12/04/2023 10:09 AM    K 4.1 12/04/2023 10:09 AM     12/04/2023 10:09 AM    CO2 26.0 12/04/2023 10:09 AM    CREATSERUM 1.06 12/04/2023 10:09 AM    CA 9.5 12/04/2023 10:09 AM     (H) 12/04/2023 10:09 AM    TP 7.7 12/04/2023 10:09 AM    ALB 4.6 12/04/2023 10:09 AM    ALKPHO 88 12/04/2023 10:09 AM    AST 17 12/04/2023 10:09 AM    ALT 21 12/04/2023 10:09 AM    BILT 1.0 12/04/2023 10:09 AM    TSH 1.365 12/04/2023 10:09 AM    T4F 1.5 12/04/2023 10:09 AM        CBC:  Lab Results   Component Value Date    WBC 6.0 12/04/2023    HGB 14.4 12/04/2023    HCT 44.0 12/04/2023    .0 12/04/2023    NEPERCENT 68.4 12/04/2023    LYPERCENT 18.7 12/04/2023    MOPERCENT 8.6 12/04/2023    EOPERCENT 2.7 12/04/2023    BAPERCENT 0.8 12/04/2023    NE 4.07 12/04/2023    LYMABS 1.11 12/04/2023    MOABSO 0.51 12/04/2023    EOABSO 0.16 12/04/2023    BAABSO 0.05 12/04/2023          Hemoglobin A1C, Microalbumin  Lab Results   Component Value Date/Time    A1C 5.4 12/04/2023 10:09 AM        Lipid panel  Lab Results   Component Value Date/Time    CHOLEST 200 (H) 12/04/2023 10:09 AM    HDL 37 (L) 12/04/2023 10:09 AM    TRIG 146 12/04/2023 10:09 AM     (H) 12/04/2023 10:09 AM    NONHDLC 163 (H) 12/04/2023 10:09 AM        Medications:  No current outpatient medications on file.      PMH:  Past Medical History:    Erectile dysfunction    Hypertension    Obesity               REVIEW OF SYSTEMS:   Review of Systems   Constitutional:  Negative for chills, fatigue, fever  and unexpected weight change.   HENT:  Negative for congestion, ear pain, hearing loss, rhinorrhea, sinus pain and sore throat.    Eyes:  Negative for pain, redness and visual disturbance.   Respiratory:  Negative for apnea, cough, chest tightness, shortness of breath and wheezing.    Cardiovascular:  Negative for chest pain, palpitations and leg swelling.   Gastrointestinal:  Negative for abdominal distention, abdominal pain, blood in stool, constipation and nausea.   Endocrine: Negative for cold intolerance, heat intolerance and polyuria.   Genitourinary:  Negative for dysuria, hematuria and urgency.   Musculoskeletal:  Negative for arthralgias, back pain, gait problem, joint swelling, myalgias and neck pain.   Skin:  Negative for rash and wound.   Allergic/Immunologic: Negative for food allergies and immunocompromised state.   Neurological:  Negative for dizziness, seizures, facial asymmetry, speech difficulty, weakness, light-headedness, numbness and headaches.   Hematological:  Negative for adenopathy. Does not bruise/bleed easily.   Psychiatric/Behavioral:  Negative for behavioral problems, sleep disturbance and suicidal ideas. The patient is not nervous/anxious.             PHYSICAL EXAM:   /80   Pulse 98   Ht 5' 11\" (1.803 m)   Wt 282 lb (127.9 kg)   SpO2 98%   BMI 39.33 kg/m²  Estimated body mass index is 39.33 kg/m² as calculated from the following:    Height as of this encounter: 5' 11\" (1.803 m).    Weight as of this encounter: 282 lb (127.9 kg).     Wt Readings from Last 3 Encounters:   12/04/24 282 lb (127.9 kg)   12/04/23 283 lb (128.4 kg)   11/11/23 274 lb (124.3 kg)       Physical Exam  Vitals reviewed.   Constitutional:       General: He is not in acute distress.     Appearance: He is well-developed.   HENT:      Head: Normocephalic and atraumatic.   Eyes:      Conjunctiva/sclera: Conjunctivae normal.      Pupils: Pupils are equal, round, and reactive to light.   Neck:      Thyroid: No  thyromegaly.   Cardiovascular:      Rate and Rhythm: Normal rate and regular rhythm.      Heart sounds: Normal heart sounds, S1 normal and S2 normal. No murmur heard.     No friction rub. No gallop.   Pulmonary:      Effort: Pulmonary effort is normal. No respiratory distress.      Breath sounds: Normal breath sounds. No wheezing or rales.   Chest:      Chest wall: No tenderness.   Abdominal:      General: Bowel sounds are normal. There is no distension.      Palpations: Abdomen is soft. There is no mass.      Tenderness: There is no abdominal tenderness. There is no guarding or rebound.   Musculoskeletal:         General: No tenderness. Normal range of motion.      Cervical back: Normal range of motion.   Lymphadenopathy:      Cervical: No cervical adenopathy.   Skin:     General: Skin is warm.      Findings: No erythema or rash.   Neurological:      Mental Status: He is alert and oriented to person, place, and time.      Cranial Nerves: No cranial nerve deficit.      Deep Tendon Reflexes: Reflexes are normal and symmetric.   Psychiatric:         Behavior: Behavior normal.         Thought Content: Thought content normal.         Judgment: Judgment normal.               ASSESSMENT AND PLAN:   Patient is a 49 year old male who presents primarily presents for:    1. Annual Physical Exam:     - No significant abnormalities noted in a 39-year-old male with a history of obesity and hyperlipidemia. Discussed age-appropriate screening and vaccines. Flu vaccine was declined.    2. Health Maintenance Screening:     - Colonoscopy has been scheduled for 12/19/2024.    3. Elevated Glucose and HbA1c:     - Advised to monitor glucose levels and encouraged lifestyle modifications, including diet and exercise. Follow-up in 6 months.    4. Keloid (Right Lateral Posterior):     - Patient reports growth in the area. Referral to general/plastic surgeon for evaluation and potential treatment. Follow-up in 6 months.    Health  Maintenance:    Health Maintenance   Topic Date Due    Zoster Vaccines (1 of 2) Never done    COVID-19 Vaccine (1 - 2024-25 season) Never done    Influenza Vaccine (1) Never done    Colorectal Cancer Screening  11/16/2024    Annual Physical  12/04/2024    PSA  12/04/2025    DTaP,Tdap,and Td Vaccines (2 - Td or Tdap) 11/05/2027    Annual Depression Screening  Completed    Pneumococcal Vaccine: Birth to 64yrs  Aged Out         Meds & Refills for this Visit:  Requested Prescriptions      No prescriptions requested or ordered in this encounter       No orders of the defined types were placed in this encounter.      Imaging & Consults:  None          No follow-ups on file.  Important follow up notes/labs for next visit      Patient indicates understanding of the above recommendations and agrees to the above plan.  Reasurrance and education provided. All questions answered.    Notified to call with any questions, complications, allergies, or worsening or changing symptoms as well as any side effects or complications from the treatments .  Red flags/ ER precautions discussed.    If diagnostic labs or imaging ordered advised patient to contact my office for results  24-48 hours after completion    This note was dictated using dragon speech recognition transcription software.  Typographical and transcription errors may be present.  Please call if any questions.       As part of our commitment to providing you with comprehensive, transparent, and timely access to your health information, we adhere to the guidelines set forth by the 21st Century Cures Act. This Act enhances your rights to access your electronic health information and ensures that you can easily obtain your medical records.  Please note that the verbage used in this note is intended for medical documentation and communication and may be interpreted as forthright.  Please do not hesitate to contact my office if you have any questions.            Shakeel Sosa  MD [facility]

## 2024-12-19 PROBLEM — D12.6 TUBULOVILLOUS ADENOMA OF COLON: Status: ACTIVE | Noted: 2024-12-19

## 2024-12-19 PROCEDURE — 88305 TISSUE EXAM BY PATHOLOGIST: CPT | Performed by: STUDENT IN AN ORGANIZED HEALTH CARE EDUCATION/TRAINING PROGRAM

## 2024-12-26 ENCOUNTER — TELEPHONE (OUTPATIENT)
Facility: CLINIC | Age: 49
End: 2024-12-26

## 2024-12-26 NOTE — TELEPHONE ENCOUNTER
Recall colonoscopy in 3 years per Dr Ricketts.    Colon done 12/19/2024    Health maintenance updated and message sent to patient outreach to repeat colonoscopy in 3 years    Results seen by patient via mychart  Seen by patient Drew Carlin on 12/24/2024  3:36 PM

## 2024-12-26 NOTE — TELEPHONE ENCOUNTER
----- Message from Jeevan Ricketts sent at 12/24/2024  3:33 PM CST -----  1 adenoma removed on recent colonoscopy. Has hx of large colon polyp.    Repeat in 3 years.    Mychart sent.    GI Staff:    Can you please place recall for this patient to have a colonoscopy in 3 years.    Thank you.    Jeevan Ricketts MD

## 2025-04-27 ENCOUNTER — HOSPITAL ENCOUNTER (OUTPATIENT)
Dept: CT IMAGING | Age: 50
Discharge: HOME | End: 2025-04-27
Attending: INTERNAL MEDICINE

## 2025-04-27 DIAGNOSIS — Z91.89 CARDIOVASCULAR RISK FACTOR: ICD-10-CM

## 2025-05-27 ENCOUNTER — PATIENT MESSAGE (OUTPATIENT)
Age: 50
End: 2025-05-27

## 2025-06-04 ENCOUNTER — HOSPITAL ENCOUNTER (OUTPATIENT)
Dept: CV DIAGNOSTICS | Facility: HOSPITAL | Age: 50
Discharge: HOME | End: 2025-06-04
Attending: INTERNAL MEDICINE
Payer: COMMERCIAL

## 2025-06-04 DIAGNOSIS — I28.8 ENLARGED PULMONARY ARTERY (HCC): ICD-10-CM

## 2025-06-04 PROCEDURE — 76376 3D RENDER W/INTRP POSTPROCES: CPT | Performed by: INTERNAL MEDICINE

## 2025-06-04 PROCEDURE — 93306 TTE W/DOPPLER COMPLETE: CPT | Performed by: INTERNAL MEDICINE

## 2025-06-10 ENCOUNTER — PATIENT MESSAGE (OUTPATIENT)
Age: 50
End: 2025-06-10

## 2025-06-13 ENCOUNTER — PATIENT MESSAGE (OUTPATIENT)
Age: 50
End: 2025-06-13

## 2025-06-16 ENCOUNTER — TELEPHONE (OUTPATIENT)
Age: 50
End: 2025-06-16

## 2025-06-16 NOTE — TELEPHONE ENCOUNTER
Message left asking patient to return our call to schedule him to see Dr. Sosa in 4 weeks from today. Okay to use SDA.     Please assist patient when he calls back.

## 2025-06-16 NOTE — TELEPHONE ENCOUNTER
----- Message from Shakeel Sosa sent at 6/13/2025 11:29 AM CDT -----  Should schedule follow up with me in 4-8 weeks.   ----- Message -----  From: Laura Riggs In  Sent: 6/4/2025   4:36 PM CDT  To: Shakeel Sosa MD

## 2025-07-10 ENCOUNTER — OFFICE VISIT (OUTPATIENT)
Age: 50
End: 2025-07-10
Payer: COMMERCIAL

## 2025-07-10 VITALS
SYSTOLIC BLOOD PRESSURE: 124 MMHG | OXYGEN SATURATION: 95 % | HEART RATE: 68 BPM | DIASTOLIC BLOOD PRESSURE: 80 MMHG | BODY MASS INDEX: 40.04 KG/M2 | TEMPERATURE: 97 F | WEIGHT: 286 LBS | HEIGHT: 71 IN

## 2025-07-10 DIAGNOSIS — Z71.3 WEIGHT LOSS COUNSELING, ENCOUNTER FOR: Primary | ICD-10-CM

## 2025-07-10 PROCEDURE — 99213 OFFICE O/P EST LOW 20 MIN: CPT | Performed by: INTERNAL MEDICINE

## 2025-07-10 NOTE — PROGRESS NOTES
[facility] part of [facility]  Return Patient Progress Note      HPI:     Chief Complaint   Patient presents with    Follow - Up     4 week follow up        Drew Carlin is a 49 year old male presenting for:    Has a significant  has a past medical history of Erectile dysfunction, Hypertension, and Obesity.    Here for follow up    Had C scope completed in dec 2024.      Here today with his wife.      He is focusing on healthy life style and weight loss.  He is working together with his wife to follow healthy diet.        Labs:   CMP:  Lab Results   Component Value Date/Time     12/04/2024 10:19 AM    K 3.9 12/04/2024 10:19 AM     12/04/2024 10:19 AM    CO2 26.0 12/04/2024 10:19 AM    CREATSERUM 1.11 12/04/2024 10:19 AM    CA 9.7 12/04/2024 10:19 AM    GLU 99 12/04/2024 10:19 AM    TP 7.8 12/04/2024 10:19 AM    ALB 4.9 (H) 12/04/2024 10:19 AM    ALKPHO 90 12/04/2024 10:19 AM    AST 25 12/04/2024 10:19 AM    ALT 25 12/04/2024 10:19 AM    BILT 1.2 12/04/2024 10:19 AM    TSH 1.365 12/04/2023 10:09 AM    T4F 1.5 12/04/2023 10:09 AM        CBC:  Lab Results   Component Value Date    WBC 7.2 12/04/2024    HGB 14.0 12/04/2024    HCT 41.9 12/04/2024    .0 12/04/2024    NEPERCENT 72.4 12/04/2024    LYPERCENT 14.4 12/04/2024    MOPERCENT 9.2 12/04/2024    EOPERCENT 2.5 12/04/2024    BAPERCENT 0.8 12/04/2024    NE 5.19 12/04/2024    LYMABS 1.03 12/04/2024    MOABSO 0.66 12/04/2024    EOABSO 0.18 12/04/2024    BAABSO 0.06 12/04/2024          Hemoglobin A1C, Microalbumin  Lab Results   Component Value Date/Time    A1C 5.7 (H) 12/04/2024 10:19 AM        Lipid panel  Lab Results   Component Value Date/Time    CHOLEST 202 (H) 12/04/2024 10:19 AM    HDL 36 (L) 12/04/2024 10:19 AM    TRIG 154 (H) 12/04/2024 10:19 AM     (H) 12/04/2024 10:19 AM    NONHDLC 166 (H) 12/04/2024 10:19 AM        Medications:  Current Outpatient Medications   Medication Sig Dispense Refill    atorvastatin 10 MG  Oral Tab Take 1 tablet (10 mg total) by mouth nightly. 90 tablet 0      PMH:  Past Medical History:    Erectile dysfunction    Hypertension    Obesity               REVIEW OF SYSTEMS:   Review of Systems   Constitutional:  Negative for chills, fatigue, fever and unexpected weight change.   HENT:  Negative for congestion, ear pain, hearing loss, rhinorrhea, sinus pain and sore throat.    Eyes:  Negative for pain, redness and visual disturbance.   Respiratory:  Negative for apnea, cough, chest tightness, shortness of breath and wheezing.    Cardiovascular:  Negative for chest pain, palpitations and leg swelling.   Gastrointestinal:  Negative for abdominal distention, abdominal pain, blood in stool, constipation and nausea.   Endocrine: Negative for cold intolerance, heat intolerance and polyuria.   Genitourinary:  Negative for dysuria, hematuria and urgency.   Musculoskeletal:  Negative for arthralgias, back pain, gait problem, joint swelling, myalgias and neck pain.   Skin:  Negative for rash and wound.   Allergic/Immunologic: Negative for food allergies and immunocompromised state.   Neurological:  Negative for dizziness, seizures, facial asymmetry, speech difficulty, weakness, light-headedness, numbness and headaches.   Hematological:  Negative for adenopathy. Does not bruise/bleed easily.   Psychiatric/Behavioral:  Negative for behavioral problems, sleep disturbance and suicidal ideas. The patient is not nervous/anxious.             PHYSICAL EXAM:   /80   Pulse 68   Temp 97.1 °F (36.2 °C)   Ht 5' 11\" (1.803 m)   Wt 286 lb (129.7 kg)   SpO2 95%   BMI 39.89 kg/m²  Estimated body mass index is 39.89 kg/m² as calculated from the following:    Height as of this encounter: 5' 11\" (1.803 m).    Weight as of this encounter: 286 lb (129.7 kg).     Wt Readings from Last 3 Encounters:   07/10/25 286 lb (129.7 kg)   12/16/24 280 lb (127 kg)   12/04/24 282 lb (127.9 kg)       Physical Exam  Vitals reviewed.    Constitutional:       General: He is not in acute distress.     Appearance: He is well-developed.   HENT:      Head: Normocephalic and atraumatic.   Eyes:      Conjunctiva/sclera: Conjunctivae normal.      Pupils: Pupils are equal, round, and reactive to light.   Neck:      Thyroid: No thyromegaly.   Cardiovascular:      Rate and Rhythm: Normal rate and regular rhythm.      Heart sounds: Normal heart sounds, S1 normal and S2 normal. No murmur heard.     No friction rub. No gallop.   Pulmonary:      Effort: Pulmonary effort is normal. No respiratory distress.      Breath sounds: Normal breath sounds. No wheezing or rales.   Chest:      Chest wall: No tenderness.   Abdominal:      General: Bowel sounds are normal. There is no distension.      Palpations: Abdomen is soft. There is no mass.      Tenderness: There is no abdominal tenderness. There is no guarding or rebound.   Musculoskeletal:         General: No tenderness. Normal range of motion.      Cervical back: Normal range of motion.   Lymphadenopathy:      Cervical: No cervical adenopathy.   Skin:     General: Skin is warm.      Findings: No erythema or rash.   Neurological:      Mental Status: He is alert and oriented to person, place, and time.      Cranial Nerves: No cranial nerve deficit.      Deep Tendon Reflexes: Reflexes are normal and symmetric.   Psychiatric:         Behavior: Behavior normal.         Thought Content: Thought content normal.         Judgment: Judgment normal.             ASSESSMENT AND PLAN:   Patient is a 49 year old male who presents primarily presents for:    (Z71.3) Weight loss counseling, encounter for  (primary encounter diagnosis)  Plan: [facility] Weight Management - Dr. Ravi Perez [facility] 9          Body mass index is 39.89 kg/m².  Discussed appropriate dietary changes and healthy life style adjustments.  Recommend increasing exercise.  Referral to weight loss clinic provided.      [facility]  Maintenance:    Health Maintenance   Topic Date Due    Zoster Vaccines (1 of 2) Never done    COVID-19 Vaccine (1 - 2024-25 season) Never done    Influenza Vaccine (1) Never done    Colorectal Cancer Screening  11/16/2024    Annual Physical  12/04/2024    PSA  12/04/2025    DTaP,Tdap,and Td Vaccines (2 - Td or Tdap) 11/05/2027    Annual Depression Screening  Completed    Pneumococcal Vaccine: Birth to 64yrs  Aged Out         Meds & Refills for this Visit:  Requested Prescriptions      No prescriptions requested or ordered in this encounter       No orders of the defined types were placed in this encounter.      Imaging & Consults:  None          No follow-ups on file.  Important follow up notes/labs for next visit      Patient indicates understanding of the above recommendations and agrees to the above plan.  Reasurrance and education provided. All questions answered.    Notified to call with any questions, complications, allergies, or worsening or changing symptoms as well as any side effects or complications from the treatments .  Red flags/ ER precautions discussed.    If diagnostic labs or imaging ordered advised patient to contact my office for results  24-48 hours after completion    This note was dictated using dragon speech recognition transcription software.  Typographical and transcription errors may be present.  Please call if any questions.       As part of our commitment to providing you with comprehensive, transparent, and timely access to your health information, we adhere to the guidelines set forth by the 21st Century Cures Act. This Act enhances your rights to access your electronic health information and ensures that you can easily obtain your medical records.  Please note that the verbage used in this note is intended for medical documentation and communication and may be interpreted as forthright.  Please do not hesitate to contact my office if you have any questions.            Shakeel Sosa  MD [facility] 5

## (undated) NOTE — ED AVS SNAPSHOT
Sotero Bales   MRN: [id number]    Department:  [facility] Emergency Department   Date of Visit:  11/11/2017           Disclosure     Insurance plans vary and the physician(s) referred by the ER may not be covered by your plan.  Please conta CARE PHYSICIAN AT ONCE OR RETURN IMMEDIATELY TO THE EMERGENCY DEPARTMENT. If you have been prescribed any medication(s), please fill your prescription right away and begin taking the medication(s) as directed.   If you believe that any of the medications

## (undated) NOTE — MR AVS SNAPSHOT
[address] at [address] [place] [address].  [address]  [place] [address]  [phone]               Thank you for choosing us for your health care visit with Josh Aviles MD.  We are glad to serve you and happy to prov these, for men ages 36 to 52. Talk with your healthcare provider to make sure you’re up to date on what you need.   Screening Who needs it How often   Alcohol misuse All men in this age group At routine exams   Blood pressure All men in this age group Every healthcare provider 3 doses over 6 months; second dose should be given 1 month after the first dose; the third dose should be given at least 2 months after the second dose and at least 4 months after the first dose   Haemophilus influenzae Type B (HIB) Men professional's instructions.              Allergies as of Apr 05, 2017     Penicillins Hives                Today's Vital Signs     BP Pulse Height Weight BMI    124/80 mmHg 66 71\" 282 lb 39.35 kg/m2         Current Medications      Notice  As of 4/5/2017 Tips for increasing your physical activity – Adults who are physically active are less likely to develop some chronic diseases than adults who are inactive.      HOW TO GET STARTED: HOW TO STAY MOTIVATED:   Start activities slowly and build up over time Do

## (undated) NOTE — ED AVS SNAPSHOT
Guy Ding   MRN: [id number]    Department:  [facility] Emergency Department   Date of Visit:  11/5/2017           Disclosure     Insurance plans vary and the physician(s) referred by the ER may not be covered by your plan.  Please contac CARE PHYSICIAN AT ONCE OR RETURN IMMEDIATELY TO THE EMERGENCY DEPARTMENT. If you have been prescribed any medication(s), please fill your prescription right away and begin taking the medication(s) as directed.   If you believe that any of the medications